# Patient Record
Sex: FEMALE | Race: WHITE | NOT HISPANIC OR LATINO | URBAN - METROPOLITAN AREA
[De-identification: names, ages, dates, MRNs, and addresses within clinical notes are randomized per-mention and may not be internally consistent; named-entity substitution may affect disease eponyms.]

---

## 2022-06-24 ENCOUNTER — OFFICE VISIT (OUTPATIENT)
Dept: URBAN - METROPOLITAN AREA CLINIC 41 | Facility: CLINIC | Age: 47
End: 2022-06-24
Payer: COMMERCIAL

## 2022-06-24 DIAGNOSIS — H43.11 VITREOUS HEMORRHAGE, RIGHT EYE: ICD-10-CM

## 2022-06-24 DIAGNOSIS — H43.813 VITREOUS DEGENERATION, BILATERAL: ICD-10-CM

## 2022-06-24 DIAGNOSIS — H33.011 RETINAL DETACHMENT WITH SINGLE BREAK, RIGHT EYE: Primary | ICD-10-CM

## 2022-06-24 PROCEDURE — 92134 CPTRZ OPH DX IMG PST SGM RTA: CPT | Performed by: OPHTHALMOLOGY

## 2022-06-24 PROCEDURE — 99204 OFFICE O/P NEW MOD 45 MIN: CPT | Performed by: OPHTHALMOLOGY

## 2022-06-24 RX ORDER — OFLOXACIN 3 MG/ML
0.3 % SOLUTION/ DROPS OPHTHALMIC
Qty: 5 | Refills: 0 | Status: INACTIVE
Start: 2022-06-24 | End: 2022-07-23

## 2022-06-24 RX ORDER — PREDNISOLONE ACETATE 10 MG/ML
1 % SUSPENSION/ DROPS OPHTHALMIC
Qty: 5 | Refills: 3 | Status: INACTIVE
Start: 2022-06-24 | End: 2022-09-21

## 2022-06-24 ASSESSMENT — INTRAOCULAR PRESSURE
OD: 12
OS: 10

## 2022-06-24 NOTE — IMPRESSION/PLAN
Impression: Retinal detachment with single break, right eye: H33.011. 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 The diagnosis, natural history, and prognosis of RRD, as well as the R/B/A of the various surgical interventions were discussed. The importance of timely intervention was emphasized with the patient. Altitude precautions with a gas bubble were discussed, including the danger of loss of the eye with travel to a higher altitude or flying. The possible need to update spectacle correction if a scleral buckle is used was explained. The 10% risk of re-detachment and PVR were explained to the patient. The patient understands that the vision usually improves gradually over a period of months to years, but may not improve to prior levels, especially when the macula is involved in the RD. The patient elects to proceed with retinal detachment repair.   

Will plan on 25G PPV/EL/gas vs SOin x mac on RD

## 2022-06-24 NOTE — IMPRESSION/PLAN
Impression: Vitreous degeneration, bilateral: H43.813. Plan: Patient notes floaters. Exam demonstrates a PVD without any RD or RT on exam OS. Discussed R/B/A of PPV vs observation for the floaters OS. The floaters are not debilitating and so observation was recommended OS. See plan above for OD. Reassurance provided. RDW discussed. Precautions discussed with the patient.

## 2022-06-24 NOTE — IMPRESSION/PLAN
Impression: Vitreous hemorrhage, right eye: H43.11. Plan: Exam confirms new vitreous hemorrhage in the right eye, secondary to trauma. Discussed R/B/A's of observation vs. PPV vs. Anti-VEGF injection.  Recommend tx as per RD above

## 2022-06-29 ENCOUNTER — POST-OPERATIVE VISIT (OUTPATIENT)
Dept: URBAN - METROPOLITAN AREA CLINIC 41 | Facility: CLINIC | Age: 47
End: 2022-06-29
Payer: COMMERCIAL

## 2022-06-29 DIAGNOSIS — H33.011 RETINAL DETACHMENT WITH SINGLE BREAK, RIGHT EYE: Primary | ICD-10-CM

## 2022-06-29 PROCEDURE — 99024 POSTOP FOLLOW-UP VISIT: CPT | Performed by: OPHTHALMOLOGY

## 2022-06-29 PROCEDURE — 92134 CPTRZ OPH DX IMG PST SGM RTA: CPT | Performed by: OPHTHALMOLOGY

## 2022-06-29 ASSESSMENT — INTRAOCULAR PRESSURE
OD: 9
OS: 12

## 2022-06-29 NOTE — IMPRESSION/PLAN
Impression: S/P 25G PPV/EL/GAS OD - 1 Day. Retinal detachment with single break, right eye  H33.011.  Plan: --Excellent post op course   
--Post operative instructions reviewed 
--Condition is improving
--IOP normal
--No signs or symptoms of infection
--Continue Pred Forte and Continue Ofloxacin 0.3% QID OD

RTC: 1 week POS OD

## 2022-07-08 ENCOUNTER — POST-OPERATIVE VISIT (OUTPATIENT)
Dept: URBAN - METROPOLITAN AREA CLINIC 41 | Facility: CLINIC | Age: 47
End: 2022-07-08
Payer: COMMERCIAL

## 2022-07-08 DIAGNOSIS — H33.011 RETINAL DETACHMENT WITH SINGLE BREAK, RIGHT EYE: Primary | ICD-10-CM

## 2022-07-08 PROCEDURE — 99024 POSTOP FOLLOW-UP VISIT: CPT | Performed by: OPHTHALMOLOGY

## 2022-07-08 ASSESSMENT — INTRAOCULAR PRESSURE
OD: 8
OS: 10

## 2022-07-08 NOTE — IMPRESSION/PLAN
Impression: S/P 25G PPV/EL/GAS OD - 10 Days. Retinal detachment with single break, right eye  H33.011. Plan: --Excellent post op course   
--Post operative instructions reviewed 
--Condition is improving 
--Taper Prednisolone acetate 1% TID x 1 wk, BID x 1wk, QD x 1wk, then 
--Discontinue Ofloxacin 0.3% RTC: 4 wks POS OD with OCT OU

## 2022-08-04 ENCOUNTER — POST-OPERATIVE VISIT (OUTPATIENT)
Dept: URBAN - METROPOLITAN AREA CLINIC 31 | Facility: CLINIC | Age: 47
End: 2022-08-04
Payer: COMMERCIAL

## 2022-08-04 DIAGNOSIS — H33.011 RETINAL DETACHMENT WITH SINGLE BREAK, RIGHT EYE: Primary | ICD-10-CM

## 2022-08-04 PROCEDURE — 99024 POSTOP FOLLOW-UP VISIT: CPT | Performed by: OPHTHALMOLOGY

## 2022-08-04 ASSESSMENT — INTRAOCULAR PRESSURE
OD: 10
OS: 10

## 2022-08-04 NOTE — IMPRESSION/PLAN
Impression: S/P 25G PPV/EL/GAS OD - 37 Days. Retinal detachment with single break, right eye  H33.011. Plan: --Excellent post op course   
--Post operative instructions reviewed 
--Condition is improving RTC: 3-4 months DFE/OCT OU

## 2022-12-09 ENCOUNTER — OFFICE VISIT (OUTPATIENT)
Dept: URBAN - METROPOLITAN AREA CLINIC 41 | Facility: CLINIC | Age: 47
End: 2022-12-09
Payer: COMMERCIAL

## 2022-12-09 DIAGNOSIS — H33.011 RETINAL DETACHMENT WITH SINGLE BREAK, RIGHT EYE: Primary | ICD-10-CM

## 2022-12-09 DIAGNOSIS — H43.812 VITREOUS DEGENERATION, LEFT EYE: ICD-10-CM

## 2022-12-09 DIAGNOSIS — H25.13 AGE-RELATED NUCLEAR CATARACT, BILATERAL: ICD-10-CM

## 2022-12-09 PROCEDURE — 92134 CPTRZ OPH DX IMG PST SGM RTA: CPT | Performed by: OPHTHALMOLOGY

## 2022-12-09 PROCEDURE — 99214 OFFICE O/P EST MOD 30 MIN: CPT | Performed by: OPHTHALMOLOGY

## 2022-12-09 ASSESSMENT — INTRAOCULAR PRESSURE
OD: 11
OS: 12

## 2022-12-09 NOTE — IMPRESSION/PLAN
Impression: Retinal detachment with single break, right eye: H33.011.
-s/p 25G PPV/EL/GAS (06/28/2022)-SI Plan: Exam and OCT demonstrates retina remains flat and attached. No new holes or breaks. No ERM. RDW. Patient to call with any vision changes.  

RTC: 9-12 months DFE/OCT OU